# Patient Record
Sex: FEMALE | Race: WHITE
[De-identification: names, ages, dates, MRNs, and addresses within clinical notes are randomized per-mention and may not be internally consistent; named-entity substitution may affect disease eponyms.]

---

## 2017-12-17 ENCOUNTER — HEALTH MAINTENANCE LETTER (OUTPATIENT)
Age: 32
End: 2017-12-17

## 2018-01-22 ENCOUNTER — TELEPHONE (OUTPATIENT)
Dept: FAMILY MEDICINE | Facility: CLINIC | Age: 33
End: 2018-01-22

## 2018-01-22 NOTE — TELEPHONE ENCOUNTER
1/22/2018    Call Regarding Preventive Health Screening Cervical/PAP    Attempt 1    Message on voicemail     Comments:       Outreach   CC

## 2018-02-19 NOTE — TELEPHONE ENCOUNTER
2/19/2018    Call Regarding Preventive Health Screening Cervical/PAP    Attempt 2    Message on voicemail     Comments:           Outreach   AT

## 2018-03-10 NOTE — TELEPHONE ENCOUNTER
3/10/2018    Call Regarding Preventive Health Screening Cervical/PAP       Attempt 3    Message  voicemail full    Comments:       Outreach   Ashleigh Sparrow